# Patient Record
(demographics unavailable — no encounter records)

---

## 2025-04-18 NOTE — END OF VISIT
Message from provider staff:    You sent Nicolasa Zuniga PA-C whom I work with FMLA paperwork on one of our injured workers. I called [facility] to follow up. Per [facility], she doesn't need this nor does she qualify for FMLA.   Please remove this electronic request.    Thanks-ABIDA Johnson  [facility]    In this knowledge, encounter will be closed. FMLA processing will be dismissed.    [Time Spent: ___ minutes] : I have spent [unfilled] minutes of time on the encounter which excludes teaching and separately reported services.

## 2025-04-18 NOTE — PROCEDURE
[See Scanned Paceart Report] : See scanned paceart report [See Device Printout] : See device printout [DDD] : DDD [Programmed for Longevity] : output reprogrammed for improved battery longevity [No] : not [Pacemaker] : pacemaker [Longevity: ___ months] : The estimated remaining battery life is [unfilled] months [Normal] : The battery status is normal. [Lead Imp:  ___ohms] : lead impedance was [unfilled] ohms [Sensing Amplitude ___mv] : sensing amplitude was [unfilled] mv [___V @] : [unfilled] V [___ ms] : [unfilled] ms [de-identified] : Medtronic [de-identified] : Vitatron G70A2  [de-identified] : [id number] [de-identified] : 09/14/2018 [de-identified] :  bpm [de-identified] : 4 years [de-identified] : Normal device function. AP 13.4% and  0.5%. No episodes.

## 2025-04-18 NOTE — CARDIOLOGY SUMMARY
[de-identified] : 09/27/2024: NSR at 68 bpm. 08/15/2023: SR w/ PVCs   HR 86bpm [de-identified] : 2018: LVEF 60%, moderate TR [de-identified] : Medtronic DC PPM 09/14/2018

## 2025-04-18 NOTE — PHYSICAL EXAM
[Normal Appearance] : normal appearance [General Appearance - In No Acute Distress] : no acute distress [Heart Rate And Rhythm] : heart rate and rhythm were normal [Heart Sounds] : normal S1 and S2 [] : no respiratory distress [Respiration, Rhythm And Depth] : normal respiratory rhythm and effort [Exaggerated Use Of Accessory Muscles For Inspiration] : no accessory muscle use [Right Infraclavicular] : right infraclavicular area [Clean] : clean [Dry] : dry [Well-Healed] : well-healed [Nail Clubbing] : no clubbing of the fingernails [Cyanosis, Localized] : no localized cyanosis [Petechial Hemorrhages (___cm)] : no petechial hemorrhages

## 2025-04-18 NOTE — HISTORY OF PRESENT ILLNESS
[None] : The patient complains of no symptoms [Palpitations] : no palpitations [SOB] : no dyspnea [Syncope] : no syncope [Dizziness] : no dizziness [Chest Pain] : no chest pain or discomfort [Pain at Site] : no pain at device site [Erythema at Site] : no erythema at device site [Swelling at Site] : no swelling at device site [de-identified] : 71F w/ a PMHx of endocarditis, intermittent CHB s/p PPM implantation on 9/14/2018 in [place], extracted and implanted on the right sided. The software was upgraded.  The patient presents today for routine device interrogation.  - Pacemaker model is Vitatron G70A2 DR. Triplett toggle to [employer] on . (Labeled : Room 5 on [facility])

## 2025-04-18 NOTE — ASSESSMENT
[FreeTextEntry1] : 71F patient w/ a PMHx of endocarditis, intermittent CHB s/p PPM implanted 9/14/2018 in [place], extracted and implanted on the right sided. The patient presents today for routine device check.   # CHB s/p DC PPM implantation Pacemaker model is Vitatron G70A2 DR. Triplett toggle to [employer] on . (Room 5 on [facility]) Normal device function. No episodes. Device does not have remote monitoring feature.  -Follow-up in 6 months or prn.   I have also advised the patient to go to the nearest emergency room if she experiences any chest pain, dyspnea, syncope, or has any other compelling symptoms.